# Patient Record
Sex: MALE | Race: BLACK OR AFRICAN AMERICAN | NOT HISPANIC OR LATINO | URBAN - METROPOLITAN AREA
[De-identification: names, ages, dates, MRNs, and addresses within clinical notes are randomized per-mention and may not be internally consistent; named-entity substitution may affect disease eponyms.]

---

## 2017-12-23 ENCOUNTER — EMERGENCY (EMERGENCY)
Facility: HOSPITAL | Age: 32
LOS: 1 days | Discharge: HOME | End: 2017-12-23
Admitting: EMERGENCY MEDICINE
Payer: MEDICAID

## 2017-12-23 VITALS
OXYGEN SATURATION: 98 % | SYSTOLIC BLOOD PRESSURE: 132 MMHG | DIASTOLIC BLOOD PRESSURE: 74 MMHG | RESPIRATION RATE: 16 BRPM | TEMPERATURE: 98 F | HEART RATE: 78 BPM | WEIGHT: 164.91 LBS

## 2017-12-23 DIAGNOSIS — S01.112A LACERATION WITHOUT FOREIGN BODY OF LEFT EYELID AND PERIOCULAR AREA, INITIAL ENCOUNTER: ICD-10-CM
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
## 2017-12-23 DIAGNOSIS — Z91.011 ALLERGY TO MILK PRODUCTS: ICD-10-CM
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
## 2017-12-23 DIAGNOSIS — Z91.013 ALLERGY TO SEAFOOD: ICD-10-CM

## 2017-12-23 PROCEDURE — 12011 RPR F/E/E/N/L/M 2.5 CM/<: CPT

## 2017-12-23 PROCEDURE — 99283 EMERGENCY DEPT VISIT LOW MDM: CPT | Mod: 25

## 2017-12-23 NOTE — ED PROVIDER NOTE - MEDICAL DECISION MAKING DETAILS
31 y/o male present with laceration to the left eyebrow. Pt denies LOC, HA. PE: Pt appears intact and is alert without slurred speech. Denies any pain. Seen ambulating in ED without difficulty. Superficial laceration repaired with dermabond. 31 y/o male present with laceration to the left eyebrow. Pt denies LOC, HA. PE: Pt appears intact and is alert without slurred speech. Denies any pain. Seen ambulating in ED without difficulty. Superficial laceration repaired with dermabond. Pt eating and drinking and is safe for dc.
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
## 2017-12-23 NOTE — ED PROVIDER NOTE - CARE PLAN
Principal Discharge DX:	Laceration of face Principal Discharge DX:	Laceration of face  Instructions for follow-up, activity and diet:	Please keep wound clean and dry and follow up with your PMD
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
## 2018-02-23 ENCOUNTER — EMERGENCY (EMERGENCY)
Facility: HOSPITAL | Age: 33
LOS: 1 days | Discharge: HOME | End: 2018-02-23
Attending: EMERGENCY MEDICINE | Admitting: EMERGENCY MEDICINE
Payer: MEDICAID

## 2018-02-23 VITALS
DIASTOLIC BLOOD PRESSURE: 61 MMHG | RESPIRATION RATE: 18 BRPM | WEIGHT: 202.83 LBS | TEMPERATURE: 97 F | HEART RATE: 94 BPM | OXYGEN SATURATION: 98 % | HEIGHT: 70 IN | SYSTOLIC BLOOD PRESSURE: 126 MMHG

## 2018-02-23 VITALS
SYSTOLIC BLOOD PRESSURE: 97 MMHG | OXYGEN SATURATION: 97 % | DIASTOLIC BLOOD PRESSURE: 62 MMHG | HEART RATE: 89 BPM | TEMPERATURE: 98 F | RESPIRATION RATE: 18 BRPM

## 2018-02-23 DIAGNOSIS — I89.0 LYMPHEDEMA, NOT ELSEWHERE CLASSIFIED: ICD-10-CM

## 2018-02-23 DIAGNOSIS — S60.511A ABRASION OF RIGHT HAND, INITIAL ENCOUNTER: ICD-10-CM
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
## 2018-02-23 DIAGNOSIS — S00.83XA CONTUSION OF OTHER PART OF HEAD, INITIAL ENCOUNTER: ICD-10-CM
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
## 2018-02-23 DIAGNOSIS — Z91.013 ALLERGY TO SEAFOOD: ICD-10-CM
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
## 2018-02-23 DIAGNOSIS — Z91.011 ALLERGY TO MILK PRODUCTS: ICD-10-CM

## 2018-02-23 DIAGNOSIS — S09.90XA UNSPECIFIED INJURY OF HEAD, INITIAL ENCOUNTER: ICD-10-CM
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
## 2018-02-23 DIAGNOSIS — B20 HUMAN IMMUNODEFICIENCY VIRUS [HIV] DISEASE: ICD-10-CM

## 2018-02-23 PROCEDURE — 70486 CT MAXILLOFACIAL W/O DYE: CPT

## 2018-02-23 PROCEDURE — 99284 EMERGENCY DEPT VISIT MOD MDM: CPT | Mod: 25

## 2018-02-23 PROCEDURE — 70450 CT HEAD/BRAIN W/O DYE: CPT

## 2018-02-23 PROCEDURE — 70486 CT MAXILLOFACIAL W/O DYE: CPT | Mod: 26

## 2018-02-23 PROCEDURE — 70450 CT HEAD/BRAIN W/O DYE: CPT | Mod: 26

## 2018-02-23 RX ORDER — IBUPROFEN 200 MG
600 TABLET ORAL ONCE
Qty: 0 | Refills: 0 | Status: DISCONTINUED | OUTPATIENT
Start: 2018-02-23 | End: 2018-02-27

## 2018-02-23 NOTE — ED ADULT NURSE NOTE - OBJECTIVE STATEMENT
Patient reports falling without loss of consciousness and now has a wound to hand.  No active bleeding.  No deformities.  Patient ambulatory with steady gait.  Provider to evaluate.

## 2018-02-23 NOTE — ED PROVIDER NOTE - OBJECTIVE STATEMENT
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 Pt admits to drinking etoh last night and tripping and falling as he was walking home. Scraping right palm and hitting left forehead on the ground, no LOC, no neck pain, no n/v, no cp/sob. Pt states he has chronic LE lymphedema due to chemo for treatment of his kaposi sarcoma, last December at [facility]. Tetanus UTD. Denies drug use or daily ETOH use.
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
## 2018-02-23 NOTE — ED PROVIDER NOTE - PHYSICAL EXAMINATION
GEN: Well appearing, well nourished, awake, alert, oriented to person, place, time/situation and in no apparent distress. NTAF. Smells of alcohol.   ENT: Airway patent, Nasal mucosa clear. Mouth with normal mucosa.   EYES: Clear bilaterally.  RESPIRATORY: Breathing comfortably with normal RR.  CARDIAC: Regular rate and rhythm  ABDOMEN: Soft, nontender, +bowel sounds, no rebound, rigidity, or guarding.  MSK: Range of motion is not limited, no deformities noted. +bilateral LE lymphadema. NVI distally/  NEURO: Alert and oriented x 3. Cn 2-12 intact. Strength 5/5 and sensation intact in all 4 extremities. no pronator drift. FTN normal. Neg Rhomberg. Gait normal.   SKIN: Skin normal color for race, warm, dry. Left forehead STS and bruising, no laceration. No skull fracture palpated. Right palm abrasion, no FB, no active bleeding.   PSYCH: Alert and oriented to person, place, time/situation. normal mood and affect. no apparent risk to self or others.

## 2022-01-27 NOTE — ED PROCEDURE NOTE - CPROC ED WOUND CLOSURE1
Alert-The patient is alert, awake and responds to voice. The patient is oriented to time, place, and person. The triage nurse is able to obtain subjective information.
dermabond/tissue adhesive

## 2022-05-11 NOTE — ED ADULT NURSE NOTE - SKIN TURGOR
Advance Care Planning     Advance Care Planning Inpatient Note  [facility] Department    Today's Date: 5/11/2022  Unit: [facility]W PROGRESSIVE CARE    Received request from IDT Member. Upon review of chart and communication with care team, request Health Care Provider's clarification of patient's decision making capacity. . Patient and fiance was/were present in the room during visit. Goals of ACP Conversation:  Discuss advance care planning documents    Health Care Decision Makers:       Primary Decision Maker: Boy Sanchez - Domestic Partner - [phone]    Secondary Decision Maker: Usha Matthews - Other - [phone]    Supplemental (Other) Decision Maker: Carlos Nuno - Other - [phone]    Summary:  Completed [facility]    Advance Care Planning Documents (Patient Wishes):  Healthcare Power of /Advance Directive Appointment of Health Care Agent     Assessment:  Patient awake and alert, sitting in chair, with fiance at bedside. Patient's nurse verified his capacity to complete advance directives at this time. Patient resides with fiance's family at this time. Interventions:  Provided education on documents for clarity and greater understanding  Assisted in the completion of documents according to patient's wishes at this time  Encouraged ongoing ACP conversation with future decision makers and loved ones    Care Preferences Communicated:   No    Outcomes/Plan:  ACP Discussion: Completed  New advance directive completed. Returned original document(s) to patient, as well as copies for distribution to appointed agents  Copy of advance directive given to staff to scan into medical record.     Electronically signed by Jennifer Ruiz, [address] on 5/11/2022 at 3:50 PM
resilient/elastic

## 2025-05-22 PROBLEM — Z00.00 ENCOUNTER FOR PREVENTIVE HEALTH EXAMINATION: Status: ACTIVE | Noted: 2025-05-22

## 2025-05-23 ENCOUNTER — APPOINTMENT (OUTPATIENT)
Dept: OTOLARYNGOLOGY | Facility: CLINIC | Age: 40
End: 2025-05-23